# Patient Record
Sex: MALE | Race: WHITE | NOT HISPANIC OR LATINO | Employment: OTHER | URBAN - METROPOLITAN AREA
[De-identification: names, ages, dates, MRNs, and addresses within clinical notes are randomized per-mention and may not be internally consistent; named-entity substitution may affect disease eponyms.]

---

## 2023-09-08 ENCOUNTER — HOSPITAL ENCOUNTER (OUTPATIENT)
Dept: DATA CONVERSION | Facility: HOSPITAL | Age: 28
End: 2023-09-08

## 2023-09-08 DIAGNOSIS — K62.89 OTHER SPECIFIED DISEASES OF ANUS AND RECTUM: ICD-10-CM

## 2023-09-30 PROBLEM — F84.0 AUTISM SPECTRUM DISORDER (HHS-HCC): Status: ACTIVE | Noted: 2023-09-30

## 2023-09-30 PROBLEM — F79 INTELLECTUAL DISABILITY: Status: ACTIVE | Noted: 2023-09-30

## 2023-09-30 PROBLEM — H93.293 OTHER ABNORMAL AUDITORY PERCEPTIONS, BILATERAL: Status: ACTIVE | Noted: 2023-09-30

## 2023-09-30 PROBLEM — H69.93 DYSFUNCTION OF BOTH EUSTACHIAN TUBES: Status: ACTIVE | Noted: 2023-09-30

## 2023-09-30 RX ORDER — CLONAZEPAM 0.5 MG/1
TABLET ORAL
COMMUNITY

## 2023-09-30 RX ORDER — ARIPIPRAZOLE 30 MG/1
TABLET ORAL
COMMUNITY

## 2023-09-30 RX ORDER — ACETAMINOPHEN 160 MG/5ML
20 SUSPENSION ORAL EVERY 6 HOURS
COMMUNITY

## 2023-09-30 RX ORDER — OLANZAPINE 10 MG/1
TABLET ORAL
COMMUNITY

## 2023-09-30 RX ORDER — POLYETHYLENE GLYCOL 3350 17 G/17G
POWDER, FOR SOLUTION ORAL
COMMUNITY

## 2023-10-04 ENCOUNTER — OFFICE VISIT (OUTPATIENT)
Dept: SURGERY | Facility: CLINIC | Age: 28
End: 2023-10-04
Payer: MEDICAID

## 2023-10-04 VITALS
TEMPERATURE: 97 F | DIASTOLIC BLOOD PRESSURE: 68 MMHG | BODY MASS INDEX: 26.36 KG/M2 | HEIGHT: 66 IN | WEIGHT: 164 LBS | SYSTOLIC BLOOD PRESSURE: 122 MMHG | HEART RATE: 54 BPM

## 2023-10-04 DIAGNOSIS — L02.03 CARBUNCLE, FACE: Primary | ICD-10-CM

## 2023-10-04 PROCEDURE — 1036F TOBACCO NON-USER: CPT | Performed by: SURGERY

## 2023-10-04 PROCEDURE — 99242 OFF/OP CONSLTJ NEW/EST SF 20: CPT | Performed by: SURGERY

## 2023-10-04 NOTE — PROGRESS NOTES
Subjective .  Here for lesion left face.  Has had this intermittently.  Has a scar in the left face.  He is intellectually disabled.  Wears headphones all the time.  Wound keeps reoccurring.    Objective   Vital signs in last 24 hours:  Temp:  [36.1 °C (97 °F)] 36.1 °C (97 °F)  Heart Rate:  [54] 54  BP: (122)/(68) 122/68    Physical exam.  Intellectually disabled.  Skin left face has a small opening.  Cleaned.  Topical DuoDERM applied.  Impression  Recurring lesion left face.  Small open wound.    Plan   Continue to keep DuoDERM dressing in place.  Change every 3 days.  Once healed apply topical Vaseline daily to keep scar soft and prevent recurrence of room.  Follow as needed.

## 2023-10-04 NOTE — PATIENT INSTRUCTIONS
Apply DuoDERM to the left face every 3 days.  Once healed apply topical Vaseline to the wound daily to keep soft.

## 2023-11-16 ENCOUNTER — APPOINTMENT (OUTPATIENT)
Dept: PREADMISSION TESTING | Facility: HOSPITAL | Age: 28
End: 2023-11-16
Payer: MEDICAID

## 2023-11-20 RX ORDER — PALIPERIDONE 6 MG/1
6 TABLET, EXTENDED RELEASE ORAL EVERY MORNING
COMMUNITY

## 2023-11-20 RX ORDER — ASPIRIN 325 MG
50000 TABLET, DELAYED RELEASE (ENTERIC COATED) ORAL
COMMUNITY

## 2023-11-28 ENCOUNTER — APPOINTMENT (OUTPATIENT)
Dept: GASTROENTEROLOGY | Facility: HOSPITAL | Age: 28
End: 2023-11-28
Payer: MEDICAID

## 2023-12-01 ENCOUNTER — APPOINTMENT (OUTPATIENT)
Dept: GASTROENTEROLOGY | Facility: HOSPITAL | Age: 28
End: 2023-12-01
Payer: MEDICAID

## 2024-01-02 ENCOUNTER — OFFICE VISIT (OUTPATIENT)
Dept: OTOLARYNGOLOGY | Facility: CLINIC | Age: 29
End: 2024-01-02
Payer: MEDICAID

## 2024-01-02 VITALS — HEIGHT: 66 IN | WEIGHT: 166 LBS | TEMPERATURE: 96.6 F | BODY MASS INDEX: 26.68 KG/M2

## 2024-01-02 DIAGNOSIS — H61.23 BILATERAL IMPACTED CERUMEN: Primary | ICD-10-CM

## 2024-01-02 PROCEDURE — 1036F TOBACCO NON-USER: CPT | Performed by: OTOLARYNGOLOGY

## 2024-01-02 PROCEDURE — 99213 OFFICE O/P EST LOW 20 MIN: CPT | Performed by: OTOLARYNGOLOGY

## 2024-01-02 NOTE — PROGRESS NOTES
"HPI  Jh Killian is a 28 y.o. male with severe autism who presents for cerumen management.  We have been cleaning him with some regularity and then because of the pandemic got away from and he was getting home care.  It is impacted now and unable to be cleaned at the facility.  He has considerable fear and is very active in the office today.  He does not communicate very much.  He speaks some words and can be understood a bit but in general it does not seem to be affecting his communication      Past Medical History:   Diagnosis Date    Other specified health status     No known health problems    Unspecified intellectual disabilities 11/11/2014    Intellectual disability            Medications:     Current Outpatient Medications:     acetaminophen 160 mg/5 mL (5 mL) suspension, Take 20 mL by mouth every 6 hours., Disp: , Rfl:     ARIPiprazole (Abilify) 30 mg tablet, ARIPiprazole 30 MG Oral Tablet  Refills: 0     Active, Disp: , Rfl:     cholecalciferol (Vitamin D-3) 50,000 unit capsule, Take 1 capsule (50,000 Units) by mouth 1 (one) time per week., Disp: , Rfl:     clonazePAM (KlonoPIN) 0.5 mg tablet, clonazePAM 0.5 MG Oral Tablet  Refills: 0     Active, Disp: , Rfl:     OLANZapine (ZyPREXA) 10 mg tablet, OLANZapine 10 MG Oral Tablet  Refills: 0     Active, Disp: , Rfl:     paliperidone (Invega) 6 mg 24 hr tablet, Take 1 tablet (6 mg) by mouth once daily in the morning. Do not crush, chew, or split., Disp: , Rfl:     polyethylene glycol (Miralax) 17 gram/dose powder, MiraLax POWD  Refills: 0     Active, Disp: , Rfl:      Allergies:  No Known Allergies     Physical Exam:  Last Recorded Vitals  Temperature 35.9 °C (96.6 °F), height 1.676 m (5' 6\"), weight 75.3 kg (166 lb).  General:     General appearance: Well-developed, well-nourished in no acute distress.  Fearful.  Turning his head to see the instruments at all times       Voice:  normal       Head/face: Normal appearance; nontender to palpation.  Acne     " Facial nerve function: Normal and symmetric bilaterally.    Oral/oropharynx:     Oral vestibule: Normal labial and gingival mucosa     Tongue/floor of mouth: Normal without lesion     Oropharynx: Clear.  No lesions present of the hard/soft palate, posterior pharynx    Neck:     Neck: Normal appearance, trachea midline     Salivary glands: Normal to palpation bilaterally     Lymph nodes: No cervical lymphadenopathy to palpation     Thyroid: No thyromegaly.  No palpable nodules     Range of motion: Normal    Neurological:     Cortical functions: Very uncooperative as below       Larynx/hypopharynx:     Laryngeal findings: Mirror exam inadequate or limited secondary to enlarged base of tongue and/or excessive gagging    Ear:     Ear canal: Normal bilaterally.  Medial cerumen impaction bilaterally.  Even the slightest introduction of the speculum or instrument into the external auditory meatus elicited a violent physical response     Tympanic membrane: Intact and mobile bilaterally     Pinna: Normal bilaterally     Hearing:  Gross hearing assessment normal by voice    Nose:     Visualized using: Anterior rhinoscopy     Nasopharynx: Inadequate mirror exam secondary to gag, anatomy.       Nasal dorsum: Nontraumatic midline appearance     Septum: Midline     Inferior turbinates: Normally sized     Mucosa: Bilateral, pink, normal appearing       Assessment/Plan   Attempted ear cleaning today.  He would not tolerate.  We could accomplish this in the operating room under general anesthesia but the benefit is somewhat questionable given that he does not appear to have any pain or infection and his communication is limited to begin with.  I have asked the staff to discuss this with his mother and let us know what her wishes are and otherwise we will monitor for clinical change         Petar Rubi MD

## 2024-01-08 ENCOUNTER — TELEPHONE (OUTPATIENT)
Dept: OTOLARYNGOLOGY | Facility: CLINIC | Age: 29
End: 2024-01-08
Payer: MEDICAID

## 2024-01-08 NOTE — TELEPHONE ENCOUNTER
Charu, caregiver from [facility], called to update you. She spoke with Jh's Mom after ceruman impactions were not able to be cleaned at 1/2/2024 appt. Mom does not want to pursue cleaning under anesthesia at this time since he is not experiencing difficulty hearing or pain related to his ears at this time.

## 2024-12-15 ENCOUNTER — HOSPITAL ENCOUNTER (EMERGENCY)
Facility: HOSPITAL | Age: 29
Discharge: HOME | End: 2024-12-16
Attending: EMERGENCY MEDICINE
Payer: MEDICAID

## 2024-12-15 ENCOUNTER — APPOINTMENT (OUTPATIENT)
Dept: RADIOLOGY | Facility: HOSPITAL | Age: 29
End: 2024-12-15
Payer: MEDICAID

## 2024-12-15 DIAGNOSIS — K52.9 ENTERITIS: ICD-10-CM

## 2024-12-15 DIAGNOSIS — E87.6 HYPOKALEMIA: ICD-10-CM

## 2024-12-15 DIAGNOSIS — R11.10 VOMITING, UNSPECIFIED VOMITING TYPE, UNSPECIFIED WHETHER NAUSEA PRESENT: Primary | ICD-10-CM

## 2024-12-15 LAB
ALBUMIN SERPL BCP-MCNC: 4.1 G/DL (ref 3.4–5)
ALP SERPL-CCNC: 75 U/L (ref 33–120)
ALT SERPL W P-5'-P-CCNC: 46 U/L (ref 10–52)
ANION GAP SERPL CALC-SCNC: 12 MMOL/L (ref 10–20)
AST SERPL W P-5'-P-CCNC: 27 U/L (ref 9–39)
BASOPHILS # BLD AUTO: 0.01 X10*3/UL (ref 0–0.1)
BASOPHILS NFR BLD AUTO: 0.1 %
BILIRUB SERPL-MCNC: 1.5 MG/DL (ref 0–1.2)
BUN SERPL-MCNC: 15 MG/DL (ref 6–23)
CALCIUM SERPL-MCNC: 8.4 MG/DL (ref 8.6–10.3)
CHLORIDE SERPL-SCNC: 100 MMOL/L (ref 98–107)
CO2 SERPL-SCNC: 26 MMOL/L (ref 21–32)
CREAT SERPL-MCNC: 0.84 MG/DL (ref 0.5–1.3)
EGFRCR SERPLBLD CKD-EPI 2021: >90 ML/MIN/1.73M*2
EOSINOPHIL # BLD AUTO: 0.01 X10*3/UL (ref 0–0.7)
EOSINOPHIL NFR BLD AUTO: 0.1 %
ERYTHROCYTE [DISTWIDTH] IN BLOOD BY AUTOMATED COUNT: 12.7 % (ref 11.5–14.5)
FLUAV RNA RESP QL NAA+PROBE: NOT DETECTED
FLUBV RNA RESP QL NAA+PROBE: NOT DETECTED
GLUCOSE SERPL-MCNC: 92 MG/DL (ref 74–99)
HCT VFR BLD AUTO: 43.6 % (ref 41–52)
HGB BLD-MCNC: 14.7 G/DL (ref 13.5–17.5)
IMM GRANULOCYTES # BLD AUTO: 0.02 X10*3/UL (ref 0–0.7)
IMM GRANULOCYTES NFR BLD AUTO: 0.3 % (ref 0–0.9)
LACTATE SERPL-SCNC: 0.9 MMOL/L (ref 0.4–2)
LIPASE SERPL-CCNC: 17 U/L (ref 9–82)
LYMPHOCYTES # BLD AUTO: 0.61 X10*3/UL (ref 1.2–4.8)
LYMPHOCYTES NFR BLD AUTO: 8.9 %
MCH RBC QN AUTO: 29.2 PG (ref 26–34)
MCHC RBC AUTO-ENTMCNC: 33.7 G/DL (ref 32–36)
MCV RBC AUTO: 87 FL (ref 80–100)
MONOCYTES # BLD AUTO: 0.45 X10*3/UL (ref 0.1–1)
MONOCYTES NFR BLD AUTO: 6.6 %
NEUTROPHILS # BLD AUTO: 5.73 X10*3/UL (ref 1.2–7.7)
NEUTROPHILS NFR BLD AUTO: 84 %
NRBC BLD-RTO: 0 /100 WBCS (ref 0–0)
PLATELET # BLD AUTO: 234 X10*3/UL (ref 150–450)
POTASSIUM SERPL-SCNC: 3.1 MMOL/L (ref 3.5–5.3)
PROT SERPL-MCNC: 7 G/DL (ref 6.4–8.2)
RBC # BLD AUTO: 5.04 X10*6/UL (ref 4.5–5.9)
SARS-COV-2 RNA RESP QL NAA+PROBE: NOT DETECTED
SODIUM SERPL-SCNC: 135 MMOL/L (ref 136–145)
WBC # BLD AUTO: 6.8 X10*3/UL (ref 4.4–11.3)

## 2024-12-15 PROCEDURE — 83605 ASSAY OF LACTIC ACID: CPT | Performed by: EMERGENCY MEDICINE

## 2024-12-15 PROCEDURE — 2500000004 HC RX 250 GENERAL PHARMACY W/ HCPCS (ALT 636 FOR OP/ED): Mod: JZ,SE | Performed by: EMERGENCY MEDICINE

## 2024-12-15 PROCEDURE — 96372 THER/PROPH/DIAG INJ SC/IM: CPT | Performed by: EMERGENCY MEDICINE

## 2024-12-15 PROCEDURE — 83690 ASSAY OF LIPASE: CPT | Performed by: EMERGENCY MEDICINE

## 2024-12-15 PROCEDURE — 85025 COMPLETE CBC W/AUTO DIFF WBC: CPT | Performed by: EMERGENCY MEDICINE

## 2024-12-15 PROCEDURE — 87636 SARSCOV2 & INF A&B AMP PRB: CPT | Performed by: EMERGENCY MEDICINE

## 2024-12-15 PROCEDURE — 84075 ASSAY ALKALINE PHOSPHATASE: CPT | Performed by: EMERGENCY MEDICINE

## 2024-12-15 PROCEDURE — 74176 CT ABD & PELVIS W/O CONTRAST: CPT | Performed by: RADIOLOGY

## 2024-12-15 PROCEDURE — 2500000001 HC RX 250 WO HCPCS SELF ADMINISTERED DRUGS (ALT 637 FOR MEDICARE OP): Mod: SE | Performed by: EMERGENCY MEDICINE

## 2024-12-15 PROCEDURE — 99284 EMERGENCY DEPT VISIT MOD MDM: CPT | Mod: 25 | Performed by: EMERGENCY MEDICINE

## 2024-12-15 PROCEDURE — 71250 CT THORAX DX C-: CPT | Performed by: RADIOLOGY

## 2024-12-15 PROCEDURE — 74176 CT ABD & PELVIS W/O CONTRAST: CPT

## 2024-12-15 PROCEDURE — 36415 COLL VENOUS BLD VENIPUNCTURE: CPT | Performed by: EMERGENCY MEDICINE

## 2024-12-15 RX ORDER — SODIUM CHLORIDE 9 MG/ML
125 INJECTION, SOLUTION INTRAVENOUS CONTINUOUS
Status: DISCONTINUED | OUTPATIENT
Start: 2024-12-15 | End: 2024-12-16 | Stop reason: HOSPADM

## 2024-12-15 RX ORDER — ONDANSETRON HYDROCHLORIDE 2 MG/ML
4 INJECTION, SOLUTION INTRAVENOUS ONCE
Status: DISCONTINUED | OUTPATIENT
Start: 2024-12-15 | End: 2024-12-16 | Stop reason: HOSPADM

## 2024-12-15 RX ORDER — POTASSIUM CHLORIDE 1.5 G/1.58G
20 POWDER, FOR SOLUTION ORAL ONCE
Status: DISCONTINUED | OUTPATIENT
Start: 2024-12-15 | End: 2024-12-15

## 2024-12-15 RX ORDER — OLANZAPINE 10 MG/2ML
10 INJECTION, POWDER, FOR SOLUTION INTRAMUSCULAR ONCE AS NEEDED
Status: COMPLETED | OUTPATIENT
Start: 2024-12-15 | End: 2024-12-15

## 2024-12-15 RX ORDER — POTASSIUM CHLORIDE 14.9 MG/ML
20 INJECTION INTRAVENOUS ONCE
Status: DISCONTINUED | OUTPATIENT
Start: 2024-12-15 | End: 2024-12-16 | Stop reason: HOSPADM

## 2024-12-15 RX ORDER — POTASSIUM CHLORIDE 1.5 G/1.58G
40 POWDER, FOR SOLUTION ORAL ONCE
Status: COMPLETED | OUTPATIENT
Start: 2024-12-15 | End: 2024-12-15

## 2024-12-15 RX ADMIN — POTASSIUM CHLORIDE 40 MEQ: 1.5 POWDER, FOR SOLUTION ORAL at 22:30

## 2024-12-15 RX ADMIN — OLANZAPINE 10 MG: 10 INJECTION, POWDER, FOR SOLUTION INTRAMUSCULAR at 18:01

## 2024-12-15 ASSESSMENT — COLUMBIA-SUICIDE SEVERITY RATING SCALE - C-SSRS
1. IN THE PAST MONTH, HAVE YOU WISHED YOU WERE DEAD OR WISHED YOU COULD GO TO SLEEP AND NOT WAKE UP?: NO
2. HAVE YOU ACTUALLY HAD ANY THOUGHTS OF KILLING YOURSELF?: NO
6. HAVE YOU EVER DONE ANYTHING, STARTED TO DO ANYTHING, OR PREPARED TO DO ANYTHING TO END YOUR LIFE?: NO

## 2024-12-15 ASSESSMENT — PAIN - FUNCTIONAL ASSESSMENT: PAIN_FUNCTIONAL_ASSESSMENT: WONG-BAKER FACES

## 2024-12-15 ASSESSMENT — PAIN SCALES - WONG BAKER: WONGBAKER_NUMERICALRESPONSE: NO HURT

## 2024-12-15 NOTE — ED PROVIDER NOTES
[facility]  ED  Provider Note  No admission date for patient encounter.  Room/bed info not found              No chief complaint on file.        History of Present Illness:   Jh Killian is a 29 y.o. male presenting to the ED for fever and vomiting, beginning this morning.  The complaint has been intermittent, mild to moderate in severity, and worsened by nothing.  Patient is an intellectual disability and cannot answer questions well.  He continues to ask to have the TV set turned on by yelling TV.  He is answered no other questions.  His group home reports the patient had an episode of nausea vomiting earlier today and a temperature of 102.      Review of Systems:   Pertinent positives and review of systems as noted above.  Remaining 10 review of systems is negative or noncontributory to today's episode of care.  Review of Systems       --------------------------------------------- PAST HISTORY ---------------------------------------------  Past Medical History:   Diagnosis Date    Other specified health status     No known health problems    Unspecified intellectual disabilities 11/11/2014    Intellectual disability         has a past surgical history that includes Other surgical history (01/11/2021).     reports that he has never smoked. He has never used smokeless tobacco.    family history includes developmental concern in his maternal cousin. Unless otherwise noted, family history is non contributory    Patient's Medications   New Prescriptions    No medications on file   Previous Medications    ACETAMINOPHEN 160 MG/5 ML (5 ML) SUSPENSION    Take 20 mL by mouth every 6 hours.    ARIPIPRAZOLE (ABILIFY) 30 MG TABLET    ARIPiprazole 30 MG Oral Tablet   Refills: 0       Active    CHOLECALCIFEROL (VITAMIN D-3) 50,000 UNIT CAPSULE    Take 1 capsule (50,000 Units) by mouth 1 (one) time per week.    CLONAZEPAM (KLONOPIN) 0.5 MG TABLET    clonazePAM 0.5 MG Oral Tablet   Refills: 0       Active     OLANZAPINE (ZYPREXA) 10 MG TABLET    OLANZapine 10 MG Oral Tablet   Refills: 0       Active    PALIPERIDONE (INVEGA) 6 MG 24 HR TABLET    Take 1 tablet (6 mg) by mouth once daily in the morning. Do not crush, chew, or split.    POLYETHYLENE GLYCOL (MIRALAX) 17 GRAM/DOSE POWDER    MiraLax POWD   Refills: 0       Active   Modified Medications    No medications on file   Discontinued Medications    No medications on file      The patient’s home medications have been reviewed.    Allergies: Patient has no known allergies.    -------------------------------------------------- RESULTS -------------------------------------------------  All laboratory and radiology results have been personally reviewed by myself   LABS:  Labs Reviewed   COMPREHENSIVE METABOLIC PANEL - Abnormal       Result Value    Glucose 92      Sodium 135 (*)     Potassium 3.1 (*)     Chloride 100      Bicarbonate 26      Anion Gap 12      Urea Nitrogen 15      Creatinine 0.84      eGFR >90      Calcium 8.4 (*)     Albumin 4.1      Alkaline Phosphatase 75      Total Protein 7.0      AST 27      Bilirubin, Total 1.5 (*)     ALT 46     CBC WITH AUTO DIFFERENTIAL - Abnormal    WBC 6.8      nRBC 0.0      RBC 5.04      Hemoglobin 14.7      Hematocrit 43.6      MCV 87      MCH 29.2      MCHC 33.7      RDW 12.7      Platelets 234      Neutrophils % 84.0      Immature Granulocytes %, Automated 0.3      Lymphocytes % 8.9      Monocytes % 6.6      Eosinophils % 0.1      Basophils % 0.1      Neutrophils Absolute 5.73      Immature Granulocytes Absolute, Automated 0.02      Lymphocytes Absolute 0.61 (*)     Monocytes Absolute 0.45      Eosinophils Absolute 0.01      Basophils Absolute 0.01     LACTATE - Normal    Lactate 0.9      Narrative:     Venipuncture immediately after or during the administration of Metamizole may lead to falsely low results. Testing should be performed immediately prior to Metamizole dosing.   LIPASE - Normal    Lipase 17      Narrative:      Venipuncture immediately after or during the administration of Metamizole may lead to falsely low results. Testing should be performed immediately prior to Metamizole dosing.   SARS-COV-2 PCR - Normal    Coronavirus 2019, PCR Not Detected      Narrative:     This assay has received FDA Emergency Use Authorization (EUA) and is only authorized for the duration of time that circumstances exist to justify the authorization of the emergency use of in vitro diagnostic tests for the detection of SARS-CoV-2 virus and/or diagnosis of COVID-19 infection under section 564(b)(1) of the Act, 21 U.S.C. 360bbb-3(b)(1). This assay is an in vitro diagnostic nucleic acid amplification test for the qualitative detection of SARS-CoV-2 from nasopharyngeal specimens and has been validated for use at [facility]. Negative results do not preclude COVID-19 infections and should not be used as the sole basis for diagnosis, treatment, or other management decisions.     INFLUENZA A AND B PCR - Normal    Flu A Result Not Detected      Flu B Result Not Detected      Narrative:     This assay is an in vitro diagnostic multiplex nucleic acid amplification test for the detection and discrimination of Influenza A & B from nasopharyngeal specimens, and has been validated for use at [facility]. Negative results do not preclude Influenza A/B infections, and should not be used as the sole basis for diagnosis, treatment, or other management decisions. If Influenza A/B and RSV PCR results are negative, testing for Parainfluenza virus, Adenovirus and Metapneumovirus is routinely performed for [facility] pediatric oncology and intensive care inpatients, and is available on other patients by placing an add-on request.   URINE CULTURE   BLOOD CULTURE   BLOOD CULTURE   URINALYSIS WITH REFLEX CULTURE AND MICROSCOPIC    Narrative:     The following orders were created for panel order Urinalysis with Reflex Culture and  "Microscopic.  Procedure                               Abnormality         Status                     ---------                               -----------         ------                     Urinalysis with Reflex C...[[id number]]                                                 Extra Urine Gray Tube[[id number]]                                                         Please view results for these tests on the individual orders.   URINALYSIS WITH REFLEX CULTURE AND MICROSCOPIC   EXTRA URINE GRAY TUBE       EKG:     RADIOLOGY:  Interpreted by Radiologist.  CT chest abdomen pelvis wo IV contrast   Final Result   CHEST   1.  Study degraded by motion artifact. Mild atelectasis at the left   lung base. Otherwise, no acute intrathoracic findings on unenhanced   CT.   2. Cardiomegaly.        ABDOMEN-PELVIS   1. Study degraded by motion artifact. Fluid distention of the   jejunum, may be secondary to ileus or enteritis.   2. Liquid stool at the colon, may be seen with   infectious/inflammatory colitis.   3. Colonic diverticulosis. Duodenal diverticulum.             MACRO:        None.        Signed by: Dahlia Collado 12/15/2024 9:35 PM   Dictation workstation:   [id number]          ------------------------- NURSING NOTES AND VITALS REVIEWED ---------------------------   The nursing notes within the ED encounter and vital signs as below have been reviewed.   /73 (BP Location: Right arm, Patient Position: Sitting)   Pulse 92   Temp 37.7 °C (99.9 °F) (Temporal)   Resp 16   Ht 1.676 m (5' 6\")   Wt 75.3 kg (166 lb)   SpO2 94%   BMI 26.79 kg/m²   Oxygen Saturation Interpretation: Normal      ---------------------------------------------------PHYSICAL EXAM--------------------------------------  Physical Exam   Constitutional/General: Alert, well appearing, non toxic in NAD  Head: Normocephalic and atraumatic  Eyes: PERRL, EOMI, conjunctiva normal, sclera non icteric  Mouth: Oropharynx clear, handling secretions, no " trismus, no asymmetry of the posterior oropharynx or uvular edema  Neck: Supple, full ROM, non tender to palpation in the midline, no stridor, no crepitus, no meningeal signs  Respiratory: Lungs clear to auscultation bilaterally, no wheezes, rales, or rhonchi  Cardiovascular:  Regular rate. Regular rhythm. No murmurs, gallops, or rubs. 2+ distal pulses  Chest: No chest wall tenderness  GI:  Abdomen Soft, Non tender, Non distended.  +BS. No organomegaly, no palpable masses,  No rebound, guarding, or rigidity. No CVAT   Musculoskeletal: Moves all extremities x 4. Warm and well perfused, no clubbing, cyanosis, or edema. Capillary refill <3 seconds  Integument: skin warm and dry. No rashes.   Lymphatic: no lymphadenopathy noted  Neurologic:  No focal deficits, symmetric strength 5/5 in the upper and lower extremities bilaterally  Psychiatric: Normal Affect    Procedures    Diagnoses as of 12/20/24 0826   Vomiting, unspecified vomiting type, unspecified whether nausea present   Enteritis   Hypokalemia          Medical Decision Making:   Patient is a difficult patient to obtain a history from.  He does not answer any questions.      Counseling:   The emergency provider has spoken with the patient and discussed today’s results, in addition to providing specific details for the plan of care and counseling regarding the diagnosis and prognosis.  Questions are answered at this time and they are agreeable with the plan.      --------------------------------- IMPRESSION AND DISPOSITION ---------------------------------        IMPRESSION  1. Vomiting, unspecified vomiting type, unspecified whether nausea present    2. Enteritis    3. Hypokalemia        DISPOSITION  Disposition: Discharge to nursing home  Patient condition is fair      Billing Provider Critical Care Time: 0 minutes     Jonathan Nails MD  12/20/24 0836

## 2024-12-15 NOTE — ED TRIAGE NOTES
Patient sent [facility] for fever and one episode of vomiting. He is autistic and unable to answer questions.

## 2024-12-16 VITALS
OXYGEN SATURATION: 98 % | TEMPERATURE: 99.9 F | HEART RATE: 84 BPM | WEIGHT: 166 LBS | HEIGHT: 66 IN | SYSTOLIC BLOOD PRESSURE: 106 MMHG | DIASTOLIC BLOOD PRESSURE: 57 MMHG | RESPIRATION RATE: 16 BRPM | BODY MASS INDEX: 26.68 KG/M2

## 2024-12-16 RX ORDER — ONDANSETRON 4 MG/1
4 TABLET, ORALLY DISINTEGRATING ORAL EVERY 8 HOURS PRN
Qty: 15 TABLET | Refills: 0 | Status: SHIPPED | OUTPATIENT
Start: 2024-12-16

## 2024-12-16 NOTE — PROGRESS NOTES
Emergency Medicine Transition of Care Note.    I received Jh Killian in signout from Dr. Nails.  Please see the previous ED provider note for all HPI, PE and MDM up to the time of signout at 1900. This is in addition to the primary record.    In brief Jh Killian is an 29 y.o. male presenting for fever, nausea, and vomiting  Chief Complaint   Patient presents with    Fever     Patient sent [facility] for fever and one episode of vomiting. He is autistic and unable to answer questions.      At the time of signout we were awaiting: Imaging results    Diagnoses as of 12/16/24 0004   Vomiting, unspecified vomiting type, unspecified whether nausea present   Enteritis   Hypokalemia       Medical Decision Making  Patient is a 29-year-old male with history of MRDD, minimally verbal, sent to the ED from his group home for a fever with associated vomiting.  Workup was initiated by the previous physician.  Lab work was unremarkable.  Patient was signed out to me pending the results of his CT imaging, reevaluation, and further disposition.    CT chest abdomen pelvis wo IV contrast   Final Result   CHEST   1.  Study degraded by motion artifact. Mild atelectasis at the left   lung base. Otherwise, no acute intrathoracic findings on unenhanced   CT.   2. Cardiomegaly.        ABDOMEN-PELVIS   1. Study degraded by motion artifact. Fluid distention of the   jejunum, may be secondary to ileus or enteritis.   2. Liquid stool at the colon, may be seen with   infectious/inflammatory colitis.   3. Colonic diverticulosis. Duodenal diverticulum.             MACRO:        None.        Signed by: Dahlia Collado 12/15/2024 9:35 PM   Dictation workstation:   [id number]        On revaluation, patient is resting comfortably in bed.  He has tolerated oral intake here in the ED.  He ate mac & cheese as well as potato chips.  He tolerated oral potassium on his mac & cheese.  He also drank water and Gatorade.  He has  not had any episodes of vomiting here in the ED.  Group home staff was informed of their lab and imaging results, and all questions and concerns were answered. Discharge planning with close outpatient follow-up was discussed at this time. Strict return precautions were given, and patient was discharged home in stable condition.      Final diagnoses:   [R11.10] Vomiting, unspecified vomiting type, unspecified whether nausea present   [K52.9] Enteritis   [E87.6] Hypokalemia       Procedure  Procedures    Denita Valencia MD